# Patient Record
(demographics unavailable — no encounter records)

---

## 2025-01-30 NOTE — PHYSICAL EXAM
[Alert] : alert [Normal Voice/Communication] : normal voice/communication [Healthy Appearing] : healthy appearing [No Acute Distress] : no acute distress [Sclera] : the sclera and conjunctiva were normal [Hearing Threshold Finger Rub Not [place]] : hearing was normal [Normal Lips/Gums] : the lips and gums were normal [Oropharynx] : the oropharynx was normal [Normal Appearance] : the appearance of the neck was normal [No Neck Mass] : no neck mass was observed [No Respiratory Distress] : no respiratory distress [No Acc Muscle Use] : no accessory muscle use [Respiration, Rhythm And Depth] : normal respiratory rhythm and effort [Auscultation Breath Sounds / Voice Sounds] : lungs were clear to auscultation bilaterally [Heart Rate And Rhythm] : heart rate was normal and rhythm regular [Normal S1, S2] : normal S1 and S2 [Murmurs] : no murmurs [Bowel Sounds] : normal bowel sounds [Abdomen Tenderness] : non-tender [No Masses] : no abdominal mass palpated [Abdomen Soft] : soft [Oriented To Time, Place, And Person] : oriented to person, place, and time [] : no hepatosplenomegaly

## 2025-01-30 NOTE — HISTORY OF PRESENT ILLNESS
[FreeTextEntry1] : Thank you for consult.  Patient is a 30 y/o male with a PMHx of Gallbladder Polyp who presents for concern due to Gallbladder Polyp shown on US abdomen. US abdomen performed on 9/30/2024 showed a 3 mm gallbladder polyp. Pt is currently asymptomatic and feeling well, denies abdominal pain. . Pt states that he works at [employer] in [facility]. Pt has never had an EGD or screening colonoscopy.  Polyp poses no health risk at this time at all and is an incidental benign finding.

## 2025-01-30 NOTE — ASSESSMENT
[FreeTextEntry1] : Discussed with pt that the gallbladder polyp is insignificant, and does not impact the pt's overall health. Instructed pt to f/u in office in 1 year for repeat US abdomen to ensure stability. Pt expressed understanding and agrees with the plan.  I spent 45 minutes with the patient as well as reviewing documents prior to and after the office visit. Patient verbalized understanding of all information provided. All questions answered and reviewed.  Robert Brunner, MD